# Patient Record
Sex: FEMALE | Race: WHITE | URBAN - METROPOLITAN AREA
[De-identification: names, ages, dates, MRNs, and addresses within clinical notes are randomized per-mention and may not be internally consistent; named-entity substitution may affect disease eponyms.]

---

## 2019-11-21 ASSESSMENT — MIFFLIN-ST. JEOR: SCORE: 1264.32

## 2019-11-22 ENCOUNTER — ANESTHESIA - HEALTHEAST (OUTPATIENT)
Dept: SURGERY | Facility: AMBULATORY SURGERY CENTER | Age: 24
End: 2019-11-22

## 2019-11-25 ENCOUNTER — SURGERY - HEALTHEAST (OUTPATIENT)
Dept: SURGERY | Facility: AMBULATORY SURGERY CENTER | Age: 24
End: 2019-11-25

## 2019-11-25 ASSESSMENT — MIFFLIN-ST. JEOR: SCORE: 1273.67

## 2021-06-03 NOTE — ANESTHESIA PREPROCEDURE EVALUATION
Anesthesia Evaluation      Patient summary reviewed     Airway   Mallampati: II  Neck ROM: full   Pulmonary - negative ROS    breath sounds clear to auscultation                         Cardiovascular - negative ROS  Exercise tolerance: > or = 4 METS  Rhythm: regular        Neuro/Psych - negative ROS     Endo/Other - negative ROS   (-) not pregnant     GI/Hepatic/Renal - negative ROS      Other findings:     NPO > 8 hrs      Dental - normal exam                        Anesthesia Plan  Planned anesthetic: general LMA      Propofol gtt with minimal gas  Zofran, decadron 10 mg  Preop PO meds    ASA 1   Induction: intravenous   Anesthetic plan and risks discussed with: patient  Anesthesia plan special considerations: antiemetics,   Post-op plan: routine recovery

## 2021-06-03 NOTE — ANESTHESIA CARE TRANSFER NOTE
Last vitals:   Vitals:    11/25/19 0905   BP: 106/55   Pulse: (!) 101   Resp: 16   Temp: 36.4  C (97.5  F)   SpO2: 100%     Patient's level of consciousness is drowsy  Spontaneous respirations: yes  Maintains airway independently: yes  Dentition unchanged: yes  Oropharynx: oropharynx clear of all foreign objects    QCDR Measures:  ASA# 20 - Surgical Safety Checklist: WHO surgical safety checklist completed prior to induction    PQRS# 430 - Adult PONV Prevention: 4558F - Pt received => 2 anti-emetic agents (different classes) preop & intraop  ASA# 8 - Peds PONV Prevention: NA - Not pediatric patient, not GA or 2 or more risk factors NOT present  PQRS# 424 - Madalyn-op Temp Management: 4559F - At least one body temp DOCUMENTED => 35.5C or 95.9F within required timeframe  PQRS# 426 - PACU Transfer Protocol: - Transfer of care checklist used  ASA# 14 - Acute Post-op Pain: ASA14B - Patient did NOT experience pain >= 7 out of 10

## 2021-06-03 NOTE — ANESTHESIA POSTPROCEDURE EVALUATION
Patient: Valeri Jain  BILATERAL PERIAREOLAR LIFT  Anesthesia type: general    Patient location: Phase II Recovery  Last vitals:   Vitals Value Taken Time   BP 99/69 11/25/2019 10:01 AM   Temp 36.3  C (97.4  F) 11/25/2019  9:47 AM   Pulse 95 11/25/2019 10:00 AM   Resp 16 11/25/2019  9:47 AM   SpO2 99 % 11/25/2019 10:00 AM   Vitals shown include unvalidated device data.  Post vital signs: stable  Level of consciousness: awake and responds to simple questions  Post-anesthesia pain: pain controlled  Post-anesthesia nausea and vomiting: no  Pulmonary: unassisted, return to baseline  Cardiovascular: stable and blood pressure at baseline  Hydration: adequate  Anesthetic events: no    QCDR Measures:  ASA# 11 - Madalyn-op Cardiac Arrest: ASA11B - Patient did NOT experience unanticipated cardiac arrest  ASA# 12 - Madalyn-op Mortality Rate: ASA12B - Patient did NOT die  ASA# 13 - PACU Re-Intubation Rate: ASA13B - Patient did NOT require a new airway mgmt  ASA# 10 - Composite Anes Safety: ASA10A - No serious adverse event    Additional Notes:

## 2021-06-04 VITALS — HEIGHT: 64 IN | BODY MASS INDEX: 20.84 KG/M2 | WEIGHT: 122.06 LBS
